# Patient Record
Sex: MALE | Race: WHITE | NOT HISPANIC OR LATINO | Employment: OTHER | URBAN - METROPOLITAN AREA
[De-identification: names, ages, dates, MRNs, and addresses within clinical notes are randomized per-mention and may not be internally consistent; named-entity substitution may affect disease eponyms.]

---

## 2017-07-19 ENCOUNTER — TRANSCRIBE ORDERS (OUTPATIENT)
Dept: ENDOCRINOLOGY | Facility: CLINIC | Age: 67
End: 2017-07-19

## 2017-07-19 DIAGNOSIS — E11.40 TYPE 2 DIABETES MELLITUS WITH DIABETIC NEUROPATHY, UNSPECIFIED LONG TERM INSULIN USE STATUS: Primary | ICD-10-CM

## 2021-02-26 ENCOUNTER — IMMUNIZATION (OUTPATIENT)
Dept: VACCINE CLINIC | Facility: HOSPITAL | Age: 71
End: 2021-02-26

## 2021-02-26 PROCEDURE — 0011A: CPT | Performed by: PHYSICIAN ASSISTANT

## 2021-02-26 PROCEDURE — 91301 HC SARSCO02 VAC 100MCG/0.5ML IM: CPT | Performed by: PHYSICIAN ASSISTANT

## 2021-03-26 ENCOUNTER — IMMUNIZATION (OUTPATIENT)
Dept: VACCINE CLINIC | Facility: HOSPITAL | Age: 71
End: 2021-03-26

## 2021-03-26 PROCEDURE — 0012A: CPT | Performed by: PHYSICIAN ASSISTANT

## 2021-03-26 PROCEDURE — 91301 HC SARSCO02 VAC 100MCG/0.5ML IM: CPT | Performed by: PHYSICIAN ASSISTANT

## 2024-10-22 ENCOUNTER — TELEPHONE (OUTPATIENT)
Dept: NEUROLOGY | Facility: CLINIC | Age: 74
End: 2024-10-22

## 2024-10-22 NOTE — TELEPHONE ENCOUNTER
Caller: Edson Olea    Relationship: Self    Best call back number: [phone]    What was the call regarding?: PT WAS ORIGINALLY SCHEDULED FOR NP APPT W/ DR. PERDUE ON THURSDAY, 10/24/24 @ 3PM. HE CALLED TO CONFIRM THIS APPT LAST WEEK.    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 PT CANNOT MAKE THE APPT FOR TOMORROW. PT WAS ORIGINALLY REFERRED BACK IN APRIL 2024. [facility] SHOWS NO APPTS UNTIL FEBRUARY 2025.     Do you require a callback?: YES, PLEASE.    PLEASE REVIEW AND ADVISE.